# Patient Record
Sex: MALE | Race: WHITE | Employment: UNEMPLOYED | URBAN - METROPOLITAN AREA
[De-identification: names, ages, dates, MRNs, and addresses within clinical notes are randomized per-mention and may not be internally consistent; named-entity substitution may affect disease eponyms.]

---

## 2019-06-14 ENCOUNTER — HOSPITAL ENCOUNTER (INPATIENT)
Age: 44
LOS: 3 days | Discharge: HOME | DRG: 754 | End: 2019-06-17
Attending: PSYCHIATRY & NEUROLOGY | Admitting: PSYCHIATRY & NEUROLOGY
Payer: MEDICAID

## 2019-06-14 PROBLEM — F32.A DEPRESSION: Status: ACTIVE | Noted: 2019-06-14

## 2019-06-14 PROBLEM — F91.9 CONDUCT DISORDER: Status: ACTIVE | Noted: 2019-06-14

## 2019-06-14 PROCEDURE — 65220000003 HC RM SEMIPRIVATE PSYCH

## 2019-06-14 PROCEDURE — 74011250637 HC RX REV CODE- 250/637: Performed by: PSYCHIATRY & NEUROLOGY

## 2019-06-14 PROCEDURE — 74011250637 HC RX REV CODE- 250/637: Performed by: INTERNAL MEDICINE

## 2019-06-14 RX ORDER — HYDROXYZINE 25 MG/1
50 TABLET, FILM COATED ORAL
Status: DISCONTINUED | OUTPATIENT
Start: 2019-06-14 | End: 2019-06-17 | Stop reason: HOSPADM

## 2019-06-14 RX ORDER — IBUPROFEN 200 MG
1 TABLET ORAL
Status: DISCONTINUED | OUTPATIENT
Start: 2019-06-14 | End: 2019-06-17 | Stop reason: HOSPADM

## 2019-06-14 RX ORDER — METFORMIN HYDROCHLORIDE 500 MG/1
1000 TABLET ORAL 2 TIMES DAILY
Status: DISCONTINUED | OUTPATIENT
Start: 2019-06-14 | End: 2019-06-17 | Stop reason: HOSPADM

## 2019-06-14 RX ORDER — BENZTROPINE MESYLATE 2 MG/1
2 TABLET ORAL
Status: DISCONTINUED | OUTPATIENT
Start: 2019-06-14 | End: 2019-06-17 | Stop reason: HOSPADM

## 2019-06-14 RX ORDER — QUETIAPINE FUMARATE 50 MG/1
50 TABLET, FILM COATED ORAL 3 TIMES DAILY
COMMUNITY
End: 2019-06-17

## 2019-06-14 RX ORDER — ATORVASTATIN CALCIUM 40 MG/1
40 TABLET, FILM COATED ORAL DAILY
Status: DISCONTINUED | OUTPATIENT
Start: 2019-06-15 | End: 2019-06-17 | Stop reason: HOSPADM

## 2019-06-14 RX ORDER — RANITIDINE 150 MG/1
150 TABLET, FILM COATED ORAL
COMMUNITY
End: 2019-06-17

## 2019-06-14 RX ORDER — PANTOPRAZOLE SODIUM 40 MG/1
40 TABLET, DELAYED RELEASE ORAL DAILY
COMMUNITY
End: 2019-06-17

## 2019-06-14 RX ORDER — ACETAMINOPHEN 325 MG/1
650 TABLET ORAL
Status: DISCONTINUED | OUTPATIENT
Start: 2019-06-14 | End: 2019-06-17 | Stop reason: HOSPADM

## 2019-06-14 RX ORDER — LORAZEPAM 2 MG/ML
2 INJECTION INTRAMUSCULAR
Status: DISCONTINUED | OUTPATIENT
Start: 2019-06-14 | End: 2019-06-17 | Stop reason: HOSPADM

## 2019-06-14 RX ORDER — QUETIAPINE FUMARATE 100 MG/1
100 TABLET, FILM COATED ORAL
COMMUNITY
End: 2019-06-17

## 2019-06-14 RX ORDER — ATORVASTATIN CALCIUM 40 MG/1
40 TABLET, FILM COATED ORAL DAILY
COMMUNITY
End: 2019-06-17

## 2019-06-14 RX ORDER — LISINOPRIL 5 MG/1
7.5 TABLET ORAL DAILY
COMMUNITY
End: 2019-06-17

## 2019-06-14 RX ORDER — BENZTROPINE MESYLATE 1 MG/ML
2 INJECTION INTRAMUSCULAR; INTRAVENOUS
Status: DISCONTINUED | OUTPATIENT
Start: 2019-06-14 | End: 2019-06-17 | Stop reason: HOSPADM

## 2019-06-14 RX ORDER — TRAZODONE HYDROCHLORIDE 50 MG/1
50 TABLET ORAL
Status: DISCONTINUED | OUTPATIENT
Start: 2019-06-14 | End: 2019-06-17 | Stop reason: HOSPADM

## 2019-06-14 RX ORDER — LISINOPRIL 5 MG/1
7.5 TABLET ORAL DAILY
Status: DISCONTINUED | OUTPATIENT
Start: 2019-06-15 | End: 2019-06-17 | Stop reason: HOSPADM

## 2019-06-14 RX ORDER — ADHESIVE BANDAGE
30 BANDAGE TOPICAL DAILY PRN
Status: DISCONTINUED | OUTPATIENT
Start: 2019-06-14 | End: 2019-06-17 | Stop reason: HOSPADM

## 2019-06-14 RX ORDER — IBUPROFEN 600 MG/1
600 TABLET ORAL
COMMUNITY
End: 2019-06-17

## 2019-06-14 RX ORDER — TRAZODONE HYDROCHLORIDE 150 MG/1
150 TABLET ORAL
COMMUNITY
End: 2019-06-17

## 2019-06-14 RX ORDER — METFORMIN HYDROCHLORIDE 1000 MG/1
1000 TABLET ORAL 2 TIMES DAILY
COMMUNITY
End: 2019-06-17

## 2019-06-14 RX ORDER — IBUPROFEN 400 MG/1
400 TABLET ORAL
Status: DISCONTINUED | OUTPATIENT
Start: 2019-06-14 | End: 2019-06-17 | Stop reason: HOSPADM

## 2019-06-14 RX ORDER — TRAZODONE HYDROCHLORIDE 100 MG/1
100 TABLET ORAL
Status: ON HOLD | COMMUNITY
End: 2019-06-14

## 2019-06-14 RX ORDER — PANTOPRAZOLE SODIUM 40 MG/1
40 TABLET, DELAYED RELEASE ORAL DAILY
Status: DISCONTINUED | OUTPATIENT
Start: 2019-06-15 | End: 2019-06-17 | Stop reason: HOSPADM

## 2019-06-14 RX ORDER — ESCITALOPRAM OXALATE 10 MG/1
10 TABLET ORAL DAILY
COMMUNITY
End: 2019-06-17

## 2019-06-14 RX ORDER — OLANZAPINE 5 MG/1
5 TABLET ORAL
Status: DISCONTINUED | OUTPATIENT
Start: 2019-06-14 | End: 2019-06-17 | Stop reason: HOSPADM

## 2019-06-14 RX ADMIN — TRAZODONE HYDROCHLORIDE 50 MG: 50 TABLET ORAL at 21:01

## 2019-06-14 RX ADMIN — METFORMIN HYDROCHLORIDE 1000 MG: 500 TABLET ORAL at 21:01

## 2019-06-14 RX ADMIN — HYDROXYZINE HYDROCHLORIDE 50 MG: 25 TABLET ORAL at 21:01

## 2019-06-14 NOTE — BH NOTES
GROUP THERAPY PROGRESS NOTE    The patient Micha renae 38 y.o. male is participating in [facility] [employer]. Group time: 45 minutes    Personal goal for participation:  To participate in mental health journey game    Goal orientation:  personal    Group therapy participation: active    Therapeutic interventions reviewed and discussed: choices in recovery    Impression of participation:  The patient was attentive-required prompting    Yanira Shankar  6/14/2019  6:00 PM

## 2019-06-14 NOTE — H&P
History and Physical    Subjective:     Liliam Parnell is a 38 y.o. with past medical hx significant for DM, HTN, GERD, Hyperlipidemia, and obesity. Pt is hospitalized at [facility] with principal diagnosis of disruptive, impulse control and conduct disorder. Pt is from [address] [place] [address]. Pt takes Metformin for DM. Pt takes Zantac and Protonix  for GERD. Pt is taking lisinopril for HTN. Pt is taking Atrovastatin for hyperlipidemia. Pt denies any acute medical issues. Pt is showing no signs of acute distress. Pt appears to be in no acute distress. PMHx: DM, HTN, HLD, GERD. PSHx: none declared by pt. FHx: DM    Social History     Tobacco Use    Smoking status: None   Substance Use Topics    Alcohol use: None     > denies any illicit drugs. Prior to Admission medications    Medication Sig Start Date End Date Taking? Authorizing Provider   escitalopram oxalate (LEXAPRO) 10 mg tablet Take 10 mg by mouth daily. Yes Provider, Historical   QUEtiapine (SEROQUEL) 50 mg tablet Take 50 mg by mouth three (3) times daily. Indications: Psychotic disorder   Yes Provider, Historical   QUEtiapine (SEROQUEL) 100 mg tablet Take 100 mg by mouth nightly. Indications: Psychotic disorder   Yes Provider, Historical   lisinopril (PRINIVIL, ZESTRIL) 5 mg tablet Take 7.5 mg by mouth daily. Indications: high blood pressure   Yes Provider, Historical   metFORMIN (GLUCOPHAGE) 1,000 mg tablet Take 1,000 mg by mouth two (2) times a day. Indications: type 2 diabetes mellitus   Yes Provider, Historical   raNITIdine (ZANTAC) 150 mg tablet Take 150 mg by mouth nightly. Indications: heartburn   Yes Provider, Historical   ibuprofen (MOTRIN) 600 mg tablet Take 600 mg by mouth every eight (8) hours as needed for Pain. Yes Provider, Historical   traZODone (DESYREL) 150 mg tablet Take 150 mg by mouth nightly as needed for Sleep. Yes Provider, Historical   pantoprazole (PROTONIX) 40 mg tablet Take 40 mg by mouth daily.  Indications: heartburn Yes Provider, Historical   atorvastatin (LIPITOR) 40 mg tablet Take 40 mg by mouth daily. Indications: excessive fat in the blood   Yes Provider, Historical     No Known Allergies     Review of Systems:  Constitutional: negative  Eyes: negative  Ears, Nose, Mouth, Throat, and Face: negative  Respiratory: negative  Cardiovascular: HTN  Gastrointestinal: negative  Genitourinary:negative  Integument/Breast: negative  Hematologic/Lymphatic: negative  Musculoskeletal:negative  Neurological: negative  Behavioral/Psychiatric: Unspecified disruptive,impulse control and conduct disorder. Endocrine: Obesity, DM, HLD  Allergic/Immunologic: negative     Objective: Intake and Output:    No intake/output data recorded. No intake/output data recorded. Physical Exam:   Visit Vitals  /85 (BP 1 Location: Right arm, BP Patient Position: Sitting)   Pulse 93   Temp 98.2 °F (36.8 °C)   Resp 18   Ht 6' (1.829 m)   Wt 147.4 kg (325 lb)   SpO2 100%   BMI 44.08 kg/m²     General:  Alert, cooperative, no distress, appears stated age. Head:  Normocephalic, without obvious abnormality, atraumatic. Eyes:  Conjunctivae/corneas clear. PERRL, EOMs intact. Ears:  Normal external ear canals both ears. Nose: Nares normal. Septum midline. Mucosa normal. No drainage or sinus tenderness. Throat: Lips, mucosa, and tongue normal. Teeth and gums normal.   Neck: Supple, symmetrical, trachea midline, no adenopathy, thyroid: no enlargement/tenderness/nodules. Back:   Symmetric, no curvature. ROM normal. No CVA tenderness. Lungs:   Clear to auscultation bilaterally. Chest wall:  No tenderness or deformity. Heart:  Regular rate and rhythm, S1, S2 normal, no murmur, click, rub or gallop. Abdomen:   Soft, non-tender. Bowel sounds normal. No masses,  No organomegaly. Extremities: Extremities normal, atraumatic, no cyanosis or edema. Pulses: Distal pulses intact.     Skin: Skin color, texture, turgor normal. No rashes or lesions   Lymph nodes: No cervical or supraclavicular adenopathy. Neurologic: CNII-XII intact. Normal strength, sensation intact, reflexes 2/4 patellar region. Data Review:   No results found for this or any previous visit (from the past 24 hour(s)). Assessment:     Active Problems:    Depression (6/14/2019)      Conduct disorder (6/14/2019)    DM    Hyperlipidemia    Obesity    GERD    HTN    Plan:     Restart Metformin    Restart Lipitor    Restart Lisinopril    Restart Protonix/Zantac    No VTE prophylaxis indicated or necessary at this time.    Signed By: Cintia Ronquillo MD     June 14, 2019

## 2019-06-14 NOTE — BH NOTES
GROUP THERAPY PROGRESS NOTE    The patient Damion renae 38 y.o. male is participating in [employer]. Group time: 1 hour    Personal goal for participation: To concentrate on selected task    Goal orientation: social    Group therapy participation: active    Therapeutic interventions reviewed and discussed: Crafts, games, music    Impression of participation: The patient was attentive.     Terry Glez  6/14/2019  5:55 PM

## 2019-06-14 NOTE — PROGRESS NOTES
[facility] Medication Reconciliation     Recommendations/Findings:   1) TDO prescreening mentions patient being on insulin. However, I am not able to confirm with either of the outpatient pharmacies the patient uses that any insulin has been filled recently. Patient is a poor medication historian but does report that he hasn't used any insulin in at least a month. The following changes were made to the PTA medication list:   Additions:   Atorvastatin  Ibuprofen  Pantoprazole  Modifications:   Lisinopril dose changed from 5 mg to 7.5 mg  Ranitidine dose scheduled changed from daily to nightly  Trazodone dose and frequency changed  Deletions: None    Total Time Spent: 40 minutes    Information obtained from: Medication list from [facility] in [place] ([phone]), [facility] ([phone]), [place] [address] ([phone]), [facility] in [place] ([phone])    Patient allergies: Allergies as of 06/14/2019    (No Known Allergies)       Prior to Admission Medications   Prescriptions Last Dose Informant Patient Reported? Taking? QUEtiapine (SEROQUEL) 100 mg tablet 6/13/2019 Other Yes Yes   Sig: Take 100 mg by mouth nightly. Indications: Psychotic disorder   QUEtiapine (SEROQUEL) 50 mg tablet 6/13/2019 Other Yes Yes   Sig: Take 50 mg by mouth three (3) times daily. Indications: Psychotic disorder   atorvastatin (LIPITOR) 40 mg tablet  Other Yes Yes   Sig: Take 40 mg by mouth daily. Indications: excessive fat in the blood   escitalopram oxalate (LEXAPRO) 10 mg tablet 6/13/2019 Other Yes Yes   Sig: Take 10 mg by mouth daily. ibuprofen (MOTRIN) 600 mg tablet  Other Yes Yes   Sig: Take 600 mg by mouth every eight (8) hours as needed for Pain. lisinopril (PRINIVIL, ZESTRIL) 5 mg tablet 6/13/2019 Other Yes Yes   Sig: Take 7.5 mg by mouth daily.  Indications: high blood pressure   metFORMIN (GLUCOPHAGE) 1,000 mg tablet 6/13/2019 Other Yes Yes   Sig: Take 1,000 mg by mouth two (2) times a day. Indications: type 2 diabetes mellitus   pantoprazole (PROTONIX) 40 mg tablet  Other Yes Yes   Sig: Take 40 mg by mouth daily. Indications: heartburn   raNITIdine (ZANTAC) 150 mg tablet 6/13/2019 Other Yes Yes   Sig: Take 150 mg by mouth nightly. Indications: heartburn   traZODone (DESYREL) 150 mg tablet  Other Yes Yes   Sig: Take 150 mg by mouth nightly as needed for Sleep.       Facility-Administered Medications: None       Thank you,  SIGRID Mckeon  Desk: [phone] ([id number])  Pharmacy: [phone] ([id number])

## 2019-06-14 NOTE — PROGRESS NOTES
1000  Pt admitted to unit, arrived via wheelchair. Pt is ambulatory, skin check performed, checked for contraband, tour of unit and room performed, diet order placed (Diabetic with options, 2000 calorie). Pt presently denies SI/HI/AVH but states he wanted to kill himself earlier when he told his mother this. (Lives with mother and is disabled, mild cogntive impairment, highest level of schooling = 9th grade). Pt stated he had no plan or means. Pt denied wanting to harm his mother but mother reported to ER staff that he had thoughts of smothering his mother with a pillow. Pt is diabetic but does not check BS at home. Pt does not know home medications; PTA put into file from written chart. Pt stated he gets his meds from [facility] in [place]. Pt states he hasn't been sleeping well and plans to \"catch up\" while here at the hospital.  Pt remains safe on 15 minute checks at this time. 1315  Pt in room, resting quietly. No complaints or concerns at this time. 65  Pt is in day room socializing with peer. Pt's laundry done and pt states he is going to take a shower after dinner. Pt denies any concerns or issues at this time. 1800  Pt seen by medical doctor for H&P. Pt able to report that he has Diabetes Type II and Hypertension but is unable to state what medications he takes. Pt went back to day room to watch television with peers and appears quite content as he smiles, laughs and talks. Pt remains safe on 15 minute checks at this time.

## 2019-06-15 PROCEDURE — 74011250637 HC RX REV CODE- 250/637: Performed by: INTERNAL MEDICINE

## 2019-06-15 PROCEDURE — 74011250637 HC RX REV CODE- 250/637: Performed by: PSYCHIATRY & NEUROLOGY

## 2019-06-15 PROCEDURE — 65220000003 HC RM SEMIPRIVATE PSYCH

## 2019-06-15 RX ORDER — MAG HYDROX/ALUMINUM HYD/SIMETH 200-200-20
30 SUSPENSION, ORAL (FINAL DOSE FORM) ORAL
Status: DISCONTINUED | OUTPATIENT
Start: 2019-06-15 | End: 2019-06-17 | Stop reason: HOSPADM

## 2019-06-15 RX ADMIN — METFORMIN HYDROCHLORIDE 1000 MG: 500 TABLET ORAL at 08:07

## 2019-06-15 RX ADMIN — ATORVASTATIN CALCIUM 40 MG: 40 TABLET, FILM COATED ORAL at 08:07

## 2019-06-15 RX ADMIN — IBUPROFEN 400 MG: 400 TABLET ORAL at 08:57

## 2019-06-15 RX ADMIN — ALUMINUM HYDROXIDE, MAGNESIUM HYDROXIDE, AND SIMETHICONE 30 ML: 200; 200; 20 SUSPENSION ORAL at 17:12

## 2019-06-15 RX ADMIN — ALUMINUM HYDROXIDE, MAGNESIUM HYDROXIDE, AND SIMETHICONE 30 ML: 200; 200; 20 SUSPENSION ORAL at 22:48

## 2019-06-15 RX ADMIN — HYDROXYZINE HYDROCHLORIDE 50 MG: 25 TABLET ORAL at 15:56

## 2019-06-15 RX ADMIN — LISINOPRIL 7.5 MG: 5 TABLET ORAL at 08:07

## 2019-06-15 RX ADMIN — METFORMIN HYDROCHLORIDE 1000 MG: 500 TABLET ORAL at 17:12

## 2019-06-15 RX ADMIN — PANTOPRAZOLE SODIUM 40 MG: 40 TABLET, DELAYED RELEASE ORAL at 08:07

## 2019-06-15 RX ADMIN — HYDROXYZINE HYDROCHLORIDE 50 MG: 25 TABLET ORAL at 21:37

## 2019-06-15 RX ADMIN — HYDROXYZINE HYDROCHLORIDE 50 MG: 25 TABLET ORAL at 09:52

## 2019-06-15 RX ADMIN — TRAZODONE HYDROCHLORIDE 50 MG: 50 TABLET ORAL at 21:37
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
## 2019-06-15 NOTE — BH NOTES
INITIAL PSYCHIATRIC EVALUATION            IDENTIFICATION:    Patient Name  Mercedes Antunez   Date of Birth 1975   [facility] [id number]   Medical Record Number  [id number]      Age  38 y.o. PCP None   Admit date:  6/14/2019    Room Number  308/02  @ [facility]   Date of Service  6/15/2019            HISTORY         REASON FOR HOSPITALIZATION:  CC: \"I want to go home\". HISTORY OF PRESENT ILLNESS:    Patient is a poor historian. He is focused on going home and would not talk about other issues. He did admit to SI before coming to hospital but did not elaborate on this. Per ER notes, patient had expressed SI and in addition had reported to ER staff he had thought of smothering mother with pillows. Currently denies SI/HI but displays manipulative behavior as when informed that he could not be discharged today, he stated that he would attempt to jump out through the window. He however later retracted this statement. Denies psychotic symptoms. ALLERGIES: No Known Allergies   MEDICATIONS PRIOR TO ADMISSION:   Medications Prior to Admission   Medication Sig    escitalopram oxalate (LEXAPRO) 10 mg tablet Take 10 mg by mouth daily.  QUEtiapine (SEROQUEL) 50 mg tablet Take 50 mg by mouth three (3) times daily. Indications: Psychotic disorder    QUEtiapine (SEROQUEL) 100 mg tablet Take 100 mg by mouth nightly. Indications: Psychotic disorder    lisinopril (PRINIVIL, ZESTRIL) 5 mg tablet Take 7.5 mg by mouth daily. Indications: high blood pressure    metFORMIN (GLUCOPHAGE) 1,000 mg tablet Take 1,000 mg by mouth two (2) times a day. Indications: type 2 diabetes mellitus    raNITIdine (ZANTAC) 150 mg tablet Take 150 mg by mouth nightly. Indications: heartburn    ibuprofen (MOTRIN) 600 mg tablet Take 600 mg by mouth every eight (8) hours as needed for Pain.  traZODone (DESYREL) 150 mg tablet Take 150 mg by mouth nightly as needed for Sleep.     pantoprazole (PROTONIX) 40 mg tablet Take 40 mg by mouth daily. Indications: heartburn    atorvastatin (LIPITOR) 40 mg tablet Take 40 mg by mouth daily. Indications: excessive fat in the blood      PAST MEDICAL HISTORY:   No past medical history on file. No past surgical history on file. SOCIAL HISTORY:   Social History     Socioeconomic History    Marital status: SINGLE     Spouse name: Not on file    Number of children: Not on file    Years of education: Not on file    Highest education level: Not on file   Occupational History    Not on file   Social Needs    Financial resource strain: Not on file    Food insecurity:     Worry: Not on file     Inability: Not on file    Transportation needs:     Medical: Not on file     Non-medical: Not on file   Tobacco Use    Smoking status: Not on file   Substance and Sexual Activity    Alcohol use: Not on file    Drug use: Not on file    Sexual activity: Not on file   Lifestyle    Physical activity:     Days per week: Not on file     Minutes per session: Not on file    Stress: Not on file   Relationships    Social connections:     Talks on phone: Not on file     Gets together: Not on file     Attends [employer] service: Not on file     Active member of club or organization: Not on file     Attends meetings of clubs or organizations: Not on file     Relationship status: Not on file    Intimate partner violence:     Fear of current or ex partner: Not on file     Emotionally abused: Not on file     Physically abused: Not on file     Forced sexual activity: Not on file   Other Topics Concern    Not on file   Social History Narrative    Not on file      FAMILY HISTORY: History reviewed. No pertinent family history. No family history on file. REVIEW OF SYSTEMS:   Pertinent items are noted in the History of Present Illness. All other Systems reviewed and are considered negative.            MENTAL STATUS EXAM & VITALS     MENTAL STATUS EXAM (MSE):    MSE FINDINGS ARE WITHIN NORMAL LIMITS (WNL) UNLESS OTHERWISE STATED BELOW. ( ALL OF THE BELOW CATEGORIES OF THE MSE HAVE BEEN REVIEWED (reviewed 6/15/2019) AND UPDATED AS DEEMED APPROPRIATE )  Appearance: Well kempt, maintains eye contact  Behavior: Manipulative. Speech: WNL  Mood: \"OK\"  Affect: Constricted  Thought process: Logical  Thought content: No delusions elicited  SI/HI: Denies  Perception: Denies AH/VH  Insight: Impaired                                                                                           VITALS:     Patient Vitals for the past 24 hrs:   Temp Pulse Resp BP SpO2   06/15/19 0800 97.8 °F (36.6 °C) 98 20 157/87 95 %   06/14/19 1930 98 °F (36.7 °C) 86 20 149/67 99 %     Wt Readings from Last 3 Encounters:   06/14/19 147.4 kg (325 lb)     Temp Readings from Last 3 Encounters:   06/15/19 97.8 °F (36.6 °C)     BP Readings from Last 3 Encounters:   06/15/19 157/87     Pulse Readings from Last 3 Encounters:   06/15/19 98            DATA     LABORATORY DATA:  Labs Reviewed - No data to display  No results found for any previous visit. RADIOLOGY REPORTS:  No results found for this or any previous visit. No results found.            MEDICATIONS       ALL MEDICATIONS  Current Facility-Administered Medications   Medication Dose Route Frequency    alum-mag hydroxide-simeth (MYLANTA) oral suspension 30 mL  30 mL Oral Q4H PRN    ziprasidone (GEODON) 20 mg in sterile water (preservative free) 1 mL injection  20 mg IntraMUSCular BID PRN    OLANZapine (ZyPREXA) tablet 5 mg  5 mg Oral Q6H PRN    benztropine (COGENTIN) tablet 2 mg  2 mg Oral BID PRN    benztropine (COGENTIN) injection 2 mg  2 mg IntraMUSCular BID PRN    LORazepam (ATIVAN) injection 2 mg  2 mg IntraMUSCular Q4H PRN    acetaminophen (TYLENOL) tablet 650 mg  650 mg Oral Q4H PRN    ibuprofen (MOTRIN) tablet 400 mg  400 mg Oral Q8H PRN    magnesium hydroxide (MILK OF MAGNESIA) 400 mg/5 mL oral suspension 30 mL  30 mL Oral DAILY PRN    nicotine (NICODERM CQ) 21 mg/24 hr patch 1 Patch  1 Patch TransDERmal DAILY PRN    hydrOXYzine HCl (ATARAX) tablet 50 mg  50 mg Oral Q6H PRN    traZODone (DESYREL) tablet 50 mg  50 mg Oral QHS PRN    atorvastatin (LIPITOR) tablet 40 mg  40 mg Oral DAILY    lisinopril (PRINIVIL, ZESTRIL) tablet 7.5 mg  7.5 mg Oral DAILY    metFORMIN (GLUCOPHAGE) tablet 1,000 mg  1,000 mg Oral BID    pantoprazole (PROTONIX) tablet 40 mg  40 mg Oral DAILY      SCHEDULED MEDICATIONS  Current Facility-Administered Medications   Medication Dose Route Frequency    atorvastatin (LIPITOR) tablet 40 mg  40 mg Oral DAILY    lisinopril (PRINIVIL, ZESTRIL) tablet 7.5 mg  7.5 mg Oral DAILY    metFORMIN (GLUCOPHAGE) tablet 1,000 mg  1,000 mg Oral BID    pantoprazole (PROTONIX) tablet 40 mg  40 mg Oral DAILY                ASSESSMENT & PLAN        The patient, Natalee Oliveira, is a 38 y.o.  male who presents at this time for treatment of the following diagnoses:  Patient Active Hospital Problem List:   Depression (6/14/2019)    Assessment: Mood disorder                           History of conduct disorder (per ER notes)    Plan:   Continue current care      I will continue to monitor blood levels (Depakote, Tegretol, lithium, clozapine---a drug with a narrow therapeutic index= NTI) and associated labs for drug therapy implemented that require intense monitoring for toxicity as deemed appropriate based on current medication side effects and pharmacodynamically determined drug 1/2 lives. A coordinated, multidisplinary treatment team (includes the nurse, unit pharmcist,  and writer) round was conducted for this initial evaluation with the patient present. The following regarding medications was addressed during rounds with patient:   the risks and benefits of the proposed medication. The patient was given the opportunity to ask questions. Informed consent given to the use of the above medications.      I will continue to adjust psychiatric and non-psychiatric medications (see above \"medication\" section and orders section for details) as deemed appropriate & based upon diagnoses and response to treatment. I have reviewed admission (and previous/old) labs and medical tests in the EHR and or transferring hospital documents. I will continue to order blood tests/labs and diagnostic tests as deemed appropriate and review results as they become available (see orders for details). I have reviewed old psychiatric and medical records available in the EHR. I Will order additional psychiatric records from other institutions to further elucidate the nature of patient's psychopathology and review once available. I will gather additional collateral information from friends, family and o/p treatment team to further elucidate the nature of patient's psychopathology and baselline level of psychiatric functioning.       ESTIMATED LENGTH OF STAY:    5 to 7 days       STRENGTHS:  Access to housing/residential stability                                        SIGNED:    Karthikeyan Mcclelland MD  6/15/2019

## 2019-06-16 LAB
ALBUMIN SERPL-MCNC: 3.8 G/DL (ref 3.5–5)
ALBUMIN/GLOB SERPL: 1.1 {RATIO} (ref 1.1–2.2)
ALP SERPL-CCNC: 62 U/L (ref 45–117)
ALT SERPL-CCNC: 45 U/L (ref 12–78)
ANION GAP SERPL CALC-SCNC: 10 MMOL/L (ref 5–15)
AST SERPL-CCNC: 26 U/L (ref 15–37)
BILIRUB SERPL-MCNC: 0.7 MG/DL (ref 0.2–1)
BUN SERPL-MCNC: 24 MG/DL (ref 6–20)
BUN/CREAT SERPL: 26 (ref 12–20)
CALCIUM SERPL-MCNC: 9.6 MG/DL (ref 8.5–10.1)
CHLORIDE SERPL-SCNC: 99 MMOL/L (ref 97–108)
CHOLEST SERPL-MCNC: 164 MG/DL
CO2 SERPL-SCNC: 29 MMOL/L (ref 21–32)
CREAT SERPL-MCNC: 0.91 MG/DL (ref 0.7–1.3)
EST. AVERAGE GLUCOSE BLD GHB EST-MCNC: 303 MG/DL
GLOBULIN SER CALC-MCNC: 3.4 G/DL (ref 2–4)
GLUCOSE SERPL-MCNC: 273 MG/DL (ref 65–100)
HBA1C MFR BLD: 12.2 % (ref 4.2–6.3)
HDLC SERPL-MCNC: 31 MG/DL
HDLC SERPL: 5.3 {RATIO} (ref 0–5)
LDLC SERPL CALC-MCNC: 82.2 MG/DL (ref 0–100)
LIPID PROFILE,FLP: ABNORMAL
POTASSIUM SERPL-SCNC: 3.5 MMOL/L (ref 3.5–5.1)
PROT SERPL-MCNC: 7.2 G/DL (ref 6.4–8.2)
SODIUM SERPL-SCNC: 138 MMOL/L (ref 136–145)
TRIGL SERPL-MCNC: 254 MG/DL (ref ?–150)
VLDLC SERPL CALC-MCNC: 50.8 MG/DL

## 2019-06-16 PROCEDURE — 36415 COLL VENOUS BLD VENIPUNCTURE: CPT

## 2019-06-16 PROCEDURE — 65220000003 HC RM SEMIPRIVATE PSYCH

## 2019-06-16 PROCEDURE — 80053 COMPREHEN METABOLIC PANEL: CPT

## 2019-06-16 PROCEDURE — 83036 HEMOGLOBIN GLYCOSYLATED A1C: CPT

## 2019-06-16 PROCEDURE — 74011250637 HC RX REV CODE- 250/637: Performed by: PSYCHIATRY & NEUROLOGY

## 2019-06-16 PROCEDURE — 74011250637 HC RX REV CODE- 250/637: Performed by: INTERNAL MEDICINE

## 2019-06-16 PROCEDURE — 80061 LIPID PANEL: CPT

## 2019-06-16 RX ADMIN — HYDROXYZINE HYDROCHLORIDE 50 MG: 25 TABLET ORAL at 13:26

## 2019-06-16 RX ADMIN — LISINOPRIL 7.5 MG: 5 TABLET ORAL at 08:17

## 2019-06-16 RX ADMIN — TRAZODONE HYDROCHLORIDE 50 MG: 50 TABLET ORAL at 21:21

## 2019-06-16 RX ADMIN — PANTOPRAZOLE SODIUM 40 MG: 40 TABLET, DELAYED RELEASE ORAL at 08:17

## 2019-06-16 RX ADMIN — ATORVASTATIN CALCIUM 40 MG: 40 TABLET, FILM COATED ORAL at 08:17

## 2019-06-16 RX ADMIN — METFORMIN HYDROCHLORIDE 1000 MG: 500 TABLET ORAL at 08:17

## 2019-06-16 RX ADMIN — METFORMIN HYDROCHLORIDE 1000 MG: 500 TABLET ORAL at 17:01

## 2019-06-16 NOTE — PROGRESS NOTES
7109  Received report from [place], [facility].    0830  Pt reports that his mother is downstairs waiting for him to be discharged. Pt denies SI/HI/AVH. 1030  Pt is pacing the hallway, smiling. Pt denies any concerns or issues at this time. [phone] [id number]  Pt requests that the doctor go to talk with his mother downstairs. Pt states that he is \"all better now\" due to his medications. 1445  Pt is watching TV in day room. Pt appears to be content, no agitation present. 1700  Pt reports that doctor spoke with mother downstairs. Pt asks if he needs to get up early in the morning so he's \"ready for discharge after the court gets here at 11:00\". 0  Pt in day room socializing with peers. Pt remains safe on 15 minute checks at this time.

## 2019-06-16 NOTE — BH NOTES
19:10 - Received report from Ardyth Bernheim, RN.    19:30 - Patient in dayroom interacting with peers and staff. Patient is in a good mood, state \"I had a great day\". Patient's mom is coming to [place] from [place]. 21:00 - Patient states he is feeling anxious. PRN Atarax and Trazodone give for anxiety and to promote sleep. Patient compliant with HS medications. 22:59 - Patient resting quietly in in bed with eyes closed. PRN Atarax and Trazodone effective    01:40 - Patient resting quietly with eyes closed. NAD noted. 03:00 - Patient resting quietly with eyes closed. 06:09 - Hourly rounds made overnight and continue. Patient slept approximately 5.75 hours this shift.

## 2019-06-16 NOTE — BH NOTES
Chief Complaint:  \"I am OK. \"    Interval History:  Patient reports he feels well. Denies ongoing SI. Denies HI  Denies psychotic symptoms. Per staff, mother is requesting that patient be discharged. She is in the ER and wishes to speak with this writer with regards to discharge plans for son. Mental Status Exam:  Appearance: Well kempt, maintains eye contact  Behavior: Cooperative. Speech: WNL  Mood: \"OK\"  Affect: Constricted  Thought process: Logical  Thought content: No delusions elicited  SI/HI: Denies  Perception: Denies AH/VH  Insight: Impaired    Assessment and Plan:  Mood disorder    Continue the current medication regimen   Disposition planning to continue. I certify that this patients inpatient psychiatric hospital services furnished since the previous certification were, and continue to be, required for treatment that could reasonably be expected to improve the patient's condition, or for diagnostic study, and that the patient continues to need, on a daily basis, active treatment furnished directly by or requiring the supervision of inpatient psychiatric facility personnel. In addition, the hospital records show that services furnished were intensive treatment services, admission or related services, or equivalent services. Current Facility-Administered Medications   Medication Dose Route Frequency    atorvastatin (LIPITOR) tablet 40 mg  40 mg Oral DAILY    lisinopril (PRINIVIL, ZESTRIL) tablet 7.5 mg  7.5 mg Oral DAILY    metFORMIN (GLUCOPHAGE) tablet 1,000 mg  1,000 mg Oral BID    pantoprazole (PROTONIX) tablet 40 mg  40 mg Oral DAILY       Physical Exam:         Past Medical History:  No past medical history on file.         Labs:  No results found for: WBC, WBCLT, HGBPOC, HGB, HGBP, HCTPOC, HCT, PHCT, RBCH, PLT, MCV, HGBEXT, HCTEXT, PLTEXT   Lab Results   Component Value Date/Time    Sodium 138 06/16/2019 04:58 AM    Potassium 3.5 06/16/2019 04:58 AM    Chloride 99 06/16/2019 04:58 AM CO2 29 06/16/2019 04:58 AM    Anion gap 10 06/16/2019 04:58 AM    Glucose 273 (H) 06/16/2019 04:58 AM    BUN 24 (H) 06/16/2019 04:58 AM    Creatinine 0.91 06/16/2019 04:58 AM    BUN/Creatinine ratio 26 (H) 06/16/2019 04:58 AM    GFR est AA >60 06/16/2019 04:58 AM    GFR est non-AA >60 06/16/2019 04:58 AM    Calcium 9.6 06/16/2019 04:58 AM    Bilirubin, total 0.7 06/16/2019 04:58 AM    AST (SGOT) 26 06/16/2019 04:58 AM    Alk.  phosphatase 62 06/16/2019 04:58 AM    Protein, total 7.2 06/16/2019 04:58 AM    Albumin 3.8 06/16/2019 04:58 AM    Globulin 3.4 06/16/2019 04:58 AM    A-G Ratio 1.1 06/16/2019 04:58 AM    ALT (SGPT) 45 06/16/2019 04:58 AM      Vitals:    06/15/19 0800 06/15/19 1650 06/15/19 1930 06/16/19 0700   BP: 157/87 137/84 140/84 131/78   Pulse: 98 94 80 93   Resp: 20 20 18 18   Temp: 97.8 °F (36.6 °C) 97.4 °F (36.3 °C) 97.5 °F (36.4 °C) 97.4 °F (36.3 °C)   SpO2: 95% 98% 96% 96%   Weight:       Height:

## 2019-06-17 VITALS
SYSTOLIC BLOOD PRESSURE: 127 MMHG | DIASTOLIC BLOOD PRESSURE: 88 MMHG | WEIGHT: 315 LBS | BODY MASS INDEX: 42.66 KG/M2 | TEMPERATURE: 97.6 F | RESPIRATION RATE: 18 BRPM | HEIGHT: 72 IN | OXYGEN SATURATION: 97 % | HEART RATE: 88 BPM

## 2019-06-17 LAB
GLUCOSE BLD STRIP.AUTO-MCNC: 249 MG/DL (ref 65–100)
GLUCOSE BLD STRIP.AUTO-MCNC: 258 MG/DL (ref 65–100)
SERVICE CMNT-IMP: ABNORMAL
SERVICE CMNT-IMP: ABNORMAL
TSH SERPL DL<=0.05 MIU/L-ACNC: 2.75 UIU/ML (ref 0.36–3.74)

## 2019-06-17 PROCEDURE — 74011250637 HC RX REV CODE- 250/637: Performed by: INTERNAL MEDICINE

## 2019-06-17 PROCEDURE — 74011636637 HC RX REV CODE- 636/637: Performed by: PSYCHIATRY & NEUROLOGY

## 2019-06-17 PROCEDURE — 36415 COLL VENOUS BLD VENIPUNCTURE: CPT

## 2019-06-17 PROCEDURE — 74011250637 HC RX REV CODE- 250/637: Performed by: PSYCHIATRY & NEUROLOGY

## 2019-06-17 PROCEDURE — 82962 GLUCOSE BLOOD TEST: CPT

## 2019-06-17 PROCEDURE — 84443 ASSAY THYROID STIM HORMONE: CPT

## 2019-06-17 RX ORDER — INSULIN LISPRO 100 [IU]/ML
INJECTION, SOLUTION INTRAVENOUS; SUBCUTANEOUS
Status: DISCONTINUED | OUTPATIENT
Start: 2019-06-17 | End: 2019-06-17 | Stop reason: HOSPADM

## 2019-06-17 RX ORDER — INSULIN LISPRO 100 [IU]/ML
INJECTION, SOLUTION INTRAVENOUS; SUBCUTANEOUS
Status: DISCONTINUED | OUTPATIENT
Start: 2019-06-17 | End: 2019-06-17

## 2019-06-17 RX ORDER — MAGNESIUM SULFATE 100 %
4 CRYSTALS MISCELLANEOUS AS NEEDED
Status: DISCONTINUED | OUTPATIENT
Start: 2019-06-17 | End: 2019-06-17 | Stop reason: HOSPADM

## 2019-06-17 RX ORDER — DEXTROSE 50 % IN WATER (D50W) INTRAVENOUS SYRINGE
12.5-25 AS NEEDED
Status: DISCONTINUED | OUTPATIENT
Start: 2019-06-17 | End: 2019-06-17 | Stop reason: HOSPADM

## 2019-06-17 RX ADMIN — METFORMIN HYDROCHLORIDE 1000 MG: 500 TABLET ORAL at 08:08

## 2019-06-17 RX ADMIN — INSULIN LISPRO 3 UNITS: 100 INJECTION, SOLUTION INTRAVENOUS; SUBCUTANEOUS at 12:36

## 2019-06-17 RX ADMIN — ACETAMINOPHEN 650 MG: 325 TABLET, FILM COATED ORAL at 11:24

## 2019-06-17 RX ADMIN — LISINOPRIL 7.5 MG: 5 TABLET ORAL at 08:08

## 2019-06-17 RX ADMIN — PANTOPRAZOLE SODIUM 40 MG: 40 TABLET, DELAYED RELEASE ORAL at 08:09

## 2019-06-17 RX ADMIN — ATORVASTATIN CALCIUM 40 MG: 40 TABLET, FILM COATED ORAL at 08:09

## 2019-06-17 RX ADMIN — HYDROXYZINE HYDROCHLORIDE 50 MG: 25 TABLET ORAL at 12:23

## 2019-06-17 NOTE — BH NOTES
Behavioral Health Transition Record to Provider    Patient Name: Arvin Glez  YOB: 1975  Medical Record Number: [id number]  Date of Admission: 6/14/2019  Date of Discharge: 6/18/2019    Attending Provider: Belem Alvarenga MD  Discharging Provider: Belem Alvarenga MD  To contact this individual call [phone] and ask the  to page. If unavailable, ask to be transferred to [facility] Provider on call. [facility] Provider will be available on call 24/7 and during holidays. Primary Care Provider: None    No Known Allergies    Reason for Admission: Per prescreening pt was in contact with CM and made disclosures of SI and later that day it escalated to SI and HI stating he was going to kill self and parents. Pt expressed he wanted to be hospitalized. Pt has a history of medication noncompliance with last appointment in October 2018. Admission Diagnosis: Depression [F32.9]  Conduct disorder [F91.9]    * No surgery found *    Results for orders placed or performed during the hospital encounter of 06/14/19   LIPID PANEL   Result Value Ref Range    LIPID PROFILE          Cholesterol, total 164 <200 MG/DL    Triglyceride 254 (H) <150 MG/DL    HDL Cholesterol 31 MG/DL    LDL, calculated 82.2 0 - 100 MG/DL    VLDL, calculated 50.8 MG/DL    CHOL/HDL Ratio 5.3 (H) 0.0 - 5.0     METABOLIC PANEL, COMPREHENSIVE   Result Value Ref Range    Sodium 138 136 - 145 mmol/L    Potassium 3.5 3.5 - 5.1 mmol/L    Chloride 99 97 - 108 mmol/L    CO2 29 21 - 32 mmol/L    Anion gap 10 5 - 15 mmol/L    Glucose 273 (H) 65 - 100 mg/dL    BUN 24 (H) 6 - 20 MG/DL    Creatinine 0.91 0.70 - 1.30 MG/DL    BUN/Creatinine ratio 26 (H) 12 - 20      GFR est AA >60 >60 ml/min/1.73m2    GFR est non-AA >60 >60 ml/min/1.73m2    Calcium 9.6 8.5 - 10.1 MG/DL    Bilirubin, total 0.7 0.2 - 1.0 MG/DL    ALT (SGPT) 45 12 - 78 U/L    AST (SGOT) 26 15 - 37 U/L    Alk.  phosphatase 62 45 - 117 U/L    Protein, total 7.2 6.4 - 8.2 g/dL    Albumin 3.8 3.5 - 5.0 g/dL    Globulin 3.4 2.0 - 4.0 g/dL    A-G Ratio 1.1 1.1 - 2.2     HEMOGLOBIN A1C WITH EAG   Result Value Ref Range    Hemoglobin A1c 12.2 (H) 4.2 - 6.3 %    Est. average glucose 303 mg/dL   TSH 3RD GENERATION   Result Value Ref Range    TSH 2.75 0.36 - 3.74 uIU/mL   GLUCOSE, POC   Result Value Ref Range    Glucose (POC) 249 (H) 65 - 100 mg/dL    Performed by Emily Ackerman (RN)    GLUCOSE, POC   Result Value Ref Range    Glucose (POC) 258 (H) 65 - 100 mg/dL    Performed by Emily Ackerman (RN)        Immunizations administered during this encounter: There is no immunization history on file for this patient. Screening for Metabolic Disorders for Patients on Antipsychotic Medications  (Data obtained from the EMR)    Estimated Body Mass Index  Estimated body mass index is 44.08 kg/m² as calculated from the following:    Height as of this encounter: 6' (1.829 m). Weight as of this encounter: 147.4 kg (325 lb). Vital Signs/Blood Pressure  Visit Vitals  /88 (BP 1 Location: Right arm)   Pulse 88   Temp 97.6 °F (36.4 °C)   Resp 18   Ht 6' (1.829 m)   Wt 147.4 kg (325 lb)   SpO2 97%   BMI 44.08 kg/m²       Blood Glucose/Hemoglobin A1c  Lab Results   Component Value Date/Time    Glucose 273 (H) 06/16/2019 04:58 AM    Glucose (POC) 258 (H) 06/17/2019 11:29 AM       Lab Results   Component Value Date/Time    Hemoglobin A1c 12.2 (H) 06/16/2019 04:58 AM        Lipid Panel  Lab Results   Component Value Date/Time    Cholesterol, total 164 06/16/2019 04:58 AM    HDL Cholesterol 31 06/16/2019 04:58 AM    LDL, calculated 82.2 06/16/2019 04:58 AM    Triglyceride 254 (H) 06/16/2019 04:58 AM    CHOL/HDL Ratio 5.3 (H) 06/16/2019 04:58 AM        Discharge Diagnosis: Please refer to physician's discharge summary. Discharge Plan: Pt was discharged and transported home by mother. Pt was denied by the court. Pt denies SI/HI/AH.    Pt's thought process is coherent and future oriented. Pt is in agreement with follow [facility] plan [facility] and meet with ID worker [employer]. Discharge Medication List and Instructions:   Discharge Medication List as of 6/17/2019  2:58 PM      STOP taking these medications       escitalopram oxalate (LEXAPRO) 10 mg tablet Comments:   Reason for Stopping:         QUEtiapine (SEROQUEL) 50 mg tablet Comments:   Reason for Stopping:         QUEtiapine (SEROQUEL) 100 mg tablet Comments:   Reason for Stopping:         lisinopril (PRINIVIL, ZESTRIL) 5 mg tablet Comments:   Reason for Stopping:         metFORMIN (GLUCOPHAGE) 1,000 mg tablet Comments:   Reason for Stopping:         raNITIdine (ZANTAC) 150 mg tablet Comments:   Reason for Stopping:         ibuprofen (MOTRIN) 600 mg tablet Comments:   Reason for Stopping:         traZODone (DESYREL) 150 mg tablet Comments:   Reason for Stopping:         pantoprazole (PROTONIX) 40 mg tablet Comments:   Reason for Stopping:         atorvastatin (LIPITOR) 40 mg tablet Comments:   Reason for Stopping:               Unresulted Labs (24h ago, onward)    None        To obtain results of studies pending at discharge, please contact [phone]    Follow-up Information     Follow up With Specialties Details Why [address] [address]  Go on 6/18/2019 Walk in Monday - Friday 8:00 AM - 3:00 PM  Please bring photo I.D., list of medications and insurance card. [facility]  Address: [address] [place] [address].  Phone: [phone]          Advanced Directive:   Does the patient have an appointed surrogate decision maker? Unknown   Does the patient have a Medical Advance Directive? Unknown   Does the patient have a Psychiatric Advance Directive? Unknown   If the patient does not have a surrogate or Medical Advance Directive AND Psychiatric Advance Directive, the patient was offered information on these advance directives. Unknown        Patient Instructions: Please continue all medications until otherwise directed by physician. Tobacco Cessation Discharge Plan:   Is the patient a smoker and needs referral for smoking cessation? No  Patient referred to the following for smoking cessation with an appointment? No   Patient was offered medication to assist with smoking cessation at discharge? No  Was education for smoking cessation added to the discharge instructions? No     Alcohol/Substance Abuse Discharge Plan:   Does the patient have a history of substance/alcohol abuse and requires a referral for treatment? Yes  Patient referred to the following for substance/alcohol abuse treatment with an appointment? Yes  Patient was offered medication to assist with alcohol cessation at discharge? No  Was education for substance/alcohol abuse added to discharge instructions? Yes     Patient discharged to Home; provided to the patient/caregiver either in hard copy or electronically. Continuing care paperwork was faxed to community mental health providers.

## 2019-06-17 NOTE — DISCHARGE INSTRUCTIONS
Patient Education        Preventing a Relapse of Depression: Care Instructions  Your Care Instructions    A relapse of depression means your symptoms have come back after you have gotten better. This illness often comes and goes during a lifetime. But there are many things you can do to keep it from coming back. Follow-up care is a key part of your treatment and safety. Be sure to make and go to all appointments, and call your doctor if you are having problems. It's also a good idea to know your test results and keep a list of the medicines you take. What do you need to know? Know your risk of relapse  Talk to your doctor to find out if you are at risk of relapse. Many things can make a person more likely to relapse into depression. These include having a family member with depression, dealing with serious problems in a relationship or a job, having a serious medical condition, or abusing drugs or alcohol. It is important to know your risk and to recognize warning signs of relapse. Once you know these things, you will be better able to keep it from happening to you. Know the warning signs of relapse  The two most common signs of relapse are:  · Feeling sad or hopeless. · Losing interest in your daily activities. You may have other symptoms, such as:  · You lose or gain weight. · You sleep too much or not enough. · You feel restless and unable to sit still. · You feel unable to move. · You feel tired all the time. · You feel unworthy or guilty without an obvious reason. · You have problems concentrating, remembering, or making decisions. · You think often about death or suicide. · You feel angry or have panic attacks. How can you care for yourself at home? · Take your medicine as prescribed. Call your doctor if you have any problems with your medicine. Many people take their medicines for at least 6 months after they have recovered. This often helps keep symptoms from coming back.  However, if your depression keeps coming back, you may have to take medicine for the rest of your life. · Continue counseling even after you have stopped taking medicine. · Eat healthy foods. Include fruits, vegetables, beans, and whole grains in your diet each day. · Get at least 30 minutes of exercise on most days of the week. Walking is a good choice. You also may want to do other activities, such as running, swimming, cycling, or playing tennis or team sports. · See your doctor right away if you have new symptoms or feel that your depression is coming back. · Keep a regular sleep schedule. Try for 8 hours of sleep a night. · Avoid alcohol and illegal drugs. · Keep the numbers for these national suicide hotlines: 6-429-683-TALK (5-205.727.6912) and 8-455-DSBURRI (9-829.554.8997). If you or someone you know talks about suicide or feeling hopeless, get help right away. When should you call for help? Call 911 anytime you think you may need emergency care.  For example, call if:    · You are thinking about suicide or are threatening suicide.     · You feel you cannot stop from hurting yourself or someone else.     · You hear or see things that aren't real.     · You think or speak in a bizarre way that is not like your usual behavior.    Call your doctor now or seek immediate medical care if:    · You are drinking a lot of alcohol or using illegal drugs.     · You are talking or writing about death.    Watch closely for changes in your health, and be sure to contact your doctor if:    · You find it hard or it's getting harder to deal with school, a job, family, or friends.     · You think your treatment is not helping or you are not getting better.     · Your symptoms get worse or you get new symptoms.     · You have any problems with your antidepressant medicines, such as side effects, or you are thinking about stopping your medicine.     · You are having manic behavior, such as having very high energy, needing less sleep than normal, or showing risky behavior such as spending money you don't have or abusing others verbally or physically. Where can you learn more? Go to http://pepe-shmuel.info/. Enter O863 in the search box to learn more about \"Preventing a Relapse of Depression: Care Instructions. \"  Current as of: September 11, 2018  Content Version: 11.9  © 2865-5753 [employer]. Care instructions adapted under license by [employer] (which disclaims liability or warranty for this information). If you have questions about a medical condition or this instruction, always ask your healthcare professional. [place] [address] any warranty or liability for your use of this information. If I feel I am at risk of hurting myself or others, I will call the crisis office and speak with a crisis worker who will assist me during my crisis.   [address]  [phone]  [address] [place] [address] [phone] [facility]  Nisa  crisis- [phone]

## 2019-06-17 NOTE — BH NOTES
Patient was in a pleasant mood. His mother was present for visitation. Patient is focused on discharge. He states his mother will be waiting for him in the emergency department for his discharge orders. He denies any current feelings of SI or HI. No auditory or hallucinations. Q 15 minute safety checks continue.  Azul SAGEN, RN

## 2019-06-17 NOTE — BH NOTES
PSYCHOSOCIAL ASSESSMENT  :Patient identifying info:  Maykel Nguyễn is a 38 y.o., male admitted 6/14/2019  9:48 AM     Presenting problem and precipitating factors:  Per prescreening pt was in contact with CM and made disclosures of SI and later that day it escalated to SI and HI stating he was going to kill self and parents. Pt expressed he wanted to be hospitalized. Pt has a history of medication noncompliance with last appointment in October 2018. Mental status assessment:  Pt is alert and oriented. Pt denies SI/HI. Pt's mood is euthymic, affect is bright. Pt's thought process is coherent and simple. Pt's insight and judgment is limited, reliability is fair. Strengths: Mother whom advocates for his mental health treatment. Collateral information:  Mariel Nova - mother [phone]    Current psychiatric /substance abuse providers and contact info: [facility] worker Lloyd Sanderson & NP [address]     Previous psychiatric/substance abuse providers and response to treatment: unknown at this time     Family history of mental illness or substance abuse: None     Substance abuse history: UDS - and BAL=0No history of drug use.     Social History     Tobacco Use    Smoking status: Not on file   Substance Use Topics    Alcohol use: Not on file       History of biomedical complications associated with substance abuse : n/a    Patient's current acceptance of treatment or motivation for change: agreeable with follow up plan at b/reach    Family constellation: single and no children     Is significant other involved? n/a      Describe support system: supportive mother and reach services     Describe living arrangements and home environment: lives with mother in [address] [place], [place] [address] Problems  Never Reviewed          Codes Class Noted POA    Depression ICD-10-CM: F32.9  ICD-9-CM: 311  6/14/2019 Unknown        Conduct disorder ICD-10-CM: F91.9  ICD-9-CM: 312.9  6/14/2019 Unknown Trauma history: Unknown     Legal issues: none reported. History of  service: none     Financial status: ssdi    Spiritism/cultural factors: none expressed at this time. Education/work history: 12th grade history of special education classes and involvement with [employer] services. Have you been licensed as a health care professional (current or ): no    Leisure and recreation preferences: unknown at this time    Describe coping skills:  Poor.     Chaparrita Castillo  2019

## 2019-06-25 NOTE — DISCHARGE SUMMARY
Some parts of the discharge summary are from the initial Psychiatric interview that was done on admission by the admitting psychiatrist.      Date of Admission: 6/14/2019    Date of Discharge:6/25/2019     TYPE OF DISCHARGE:   REGULAR -  NO  AMA - YES  RELEASED BY THE TDO ASSESSMENT TEAM - YES       REASON FOR HOSPITALIZATION:  CC: \"I want to go home\". HISTORY OF PRESENT ILLNESS:    Patient is a poor historian. He is focused on going home and would not talk about other issues. He did admit to SI before coming to hospital but did not elaborate on this. Per ER notes, patient had expressed SI and in addition had reported to ER staff he had thought of smothering mother with pillows. Currently denies SI/HI but displays manipulative behavior as when informed that he could not be discharged today, he stated that he would attempt to jump out through the window. He however later retracted this statement. Denies psychotic symptoms. ALLERGIES: No Known Allergies   MEDICATIONS PRIOR TO ADMISSION:        Medications Prior to Admission   Medication Sig    escitalopram oxalate (LEXAPRO) 10 mg tablet Take 10 mg by mouth daily.  QUEtiapine (SEROQUEL) 50 mg tablet Take 50 mg by mouth three (3) times daily. Indications: Psychotic disorder    QUEtiapine (SEROQUEL) 100 mg tablet Take 100 mg by mouth nightly. Indications: Psychotic disorder    lisinopril (PRINIVIL, ZESTRIL) 5 mg tablet Take 7.5 mg by mouth daily. Indications: high blood pressure    metFORMIN (GLUCOPHAGE) 1,000 mg tablet Take 1,000 mg by mouth two (2) times a day. Indications: type 2 diabetes mellitus    raNITIdine (ZANTAC) 150 mg tablet Take 150 mg by mouth nightly. Indications: heartburn    ibuprofen (MOTRIN) 600 mg tablet Take 600 mg by mouth every eight (8) hours as needed for Pain.  traZODone (DESYREL) 150 mg tablet Take 150 mg by mouth nightly as needed for Sleep.  pantoprazole (PROTONIX) 40 mg tablet Take 40 mg by mouth daily.  Indications: heartburn    atorvastatin (LIPITOR) 40 mg tablet Take 40 mg by mouth daily. Indications: excessive fat in the blood       PAST MEDICAL HISTORY:   No past medical history on file. No past surgical history on file. SOCIAL HISTORY:   Social History      Socioeconomic History    Marital status: SINGLE       Spouse name: Not on file    Number of children: Not on file    Years of education: Not on file    Highest education level: Not on file   Occupational History    Not on file   Social Needs    Financial resource strain: Not on file    Food insecurity:       Worry: Not on file       Inability: Not on file    Transportation needs:       Medical: Not on file       Non-medical: Not on file   Tobacco Use    Smoking status: Not on file   Substance and Sexual Activity    Alcohol use: Not on file    Drug use: Not on file    Sexual activity: Not on file   Lifestyle    Physical activity:       Days per week: Not on file       Minutes per session: Not on file    Stress: Not on file   Relationships    Social connections:       Talks on phone: Not on file       Gets together: Not on file       Attends [employer] service: Not on file       Active member of club or organization: Not on file       Attends meetings of clubs or organizations: Not on file       Relationship status: Not on file    Intimate partner violence:       Fear of current or ex partner: Not on file       Emotionally abused: Not on file       Physically abused: Not on file       Forced sexual activity: Not on file   Other Topics Concern    Not on file   Social History Narrative    Not on file       FAMILY HISTORY: History reviewed. No pertinent family history. No family history on file. REVIEW OF SYSTEMS:   Pertinent items are noted in the History of Present Illness.   All other Systems reviewed and are considered negative.               MENTAL STATUS EXAM & VITALS      MENTAL STATUS EXAM (MSE):    MSE FINDINGS ARE WITHIN NORMAL LIMITS (WNL) UNLESS OTHERWISE STATED BELOW. ( ALL OF THE BELOW CATEGORIES OF THE MSE HAVE BEEN REVIEWED (reviewed 6/15/2019) AND UPDATED AS DEEMED APPROPRIATE )  Appearance: Well kempt, maintains eye contact  Behavior: Manipulative. Speech: WNL  Mood: \"OK\"  Affect: Constricted  Thought process: Logical  Thought content: No delusions elicited  SI/HI: Denies  Perception: Denies AH/VH  Insight: Impaired                                                                                                                                      VITALS:     Patient Vitals for the past 24 hrs:    Temp Pulse Resp BP SpO2   06/15/19 0800 97.8 °F (36.6 °C) 98 20 157/87 95 %   06/14/19 1930 98 °F (36.7 °C) 86 20 149/67 99 %          Wt Readings from Last 3 Encounters:   06/14/19 147.4 kg (325 lb)          Temp Readings from Last 3 Encounters:   06/15/19 97.8 °F (36.6 °C)          BP Readings from Last 3 Encounters:   06/15/19 157/87          Pulse Readings from Last 3 Encounters:   06/15/19 98                DATA      LABORATORY DATA:  Labs Reviewed - No data to display  No results found for any previous visit.          RADIOLOGY REPORTS:  No results found for this or any previous visit. No results found.               MEDICATIONS         ALL MEDICATIONS         Current Facility-Administered Medications   Medication Dose Route Frequency    alum-mag hydroxide-simeth (MYLANTA) oral suspension 30 mL  30 mL Oral Q4H PRN    ziprasidone (GEODON) 20 mg in sterile water (preservative free) 1 mL injection  20 mg IntraMUSCular BID PRN    OLANZapine (ZyPREXA) tablet 5 mg  5 mg Oral Q6H PRN    benztropine (COGENTIN) tablet 2 mg  2 mg Oral BID PRN    benztropine (COGENTIN) injection 2 mg  2 mg IntraMUSCular BID PRN    LORazepam (ATIVAN) injection 2 mg  2 mg IntraMUSCular Q4H PRN    acetaminophen (TYLENOL) tablet 650 mg  650 mg Oral Q4H PRN    ibuprofen (MOTRIN) tablet 400 mg  400 mg Oral Q8H PRN    magnesium hydroxide (MILK OF MAGNESIA) 400 mg/5 mL oral suspension 30 mL  30 mL Oral DAILY PRN    nicotine (NICODERM CQ) 21 mg/24 hr patch 1 Patch  1 Patch TransDERmal DAILY PRN    hydrOXYzine HCl (ATARAX) tablet 50 mg  50 mg Oral Q6H PRN    traZODone (DESYREL) tablet 50 mg  50 mg Oral QHS PRN    atorvastatin (LIPITOR) tablet 40 mg  40 mg Oral DAILY    lisinopril (PRINIVIL, ZESTRIL) tablet 7.5 mg  7.5 mg Oral DAILY    metFORMIN (GLUCOPHAGE) tablet 1,000 mg  1,000 mg Oral BID    pantoprazole (PROTONIX) tablet 40 mg  40 mg Oral DAILY       SCHEDULED MEDICATIONS         Current Facility-Administered Medications   Medication Dose Route Frequency    atorvastatin (LIPITOR) tablet 40 mg  40 mg Oral DAILY    lisinopril (PRINIVIL, ZESTRIL) tablet 7.5 mg  7.5 mg Oral DAILY    metFORMIN (GLUCOPHAGE) tablet 1,000 mg  1,000 mg Oral BID    pantoprazole (PROTONIX) tablet 40 mg  40 mg Oral DAILY                      ASSESSMENT & PLAN         The patient, Winnie Rivera, is a 38 y.o.  male who presents at this time for treatment of the following diagnoses:  Patient Active Hospital Problem List:   Depression (6/14/2019)    Assessment: Mood disorder                           History of conduct disorder (per ER notes)       Course in the Hospital:     Patient was admitted to the inpatient psychiatry unit for acute psychiatric stabilization in regards to symptomatology as described in the HPI above and placed on Q15 minute checks and withdrawal precautions. While on the unit Winnie Rivera was involved in individual, group, occupational and milieu therapy. He was started back on his usual medication regimen as well as PRN medications including  . He improved gradually and was able to integrate into the milieu with help from the nursing staff. Patients symptoms improved gradually including Seroquel, Lexapro, Metformin and Lisinopril. He requested to be discharged and was evaluated by the [facility] crisis team and they felt he dd not meet criteria for a TDO.  He was discharged AMA at his request with follow up at the [address] [place] [address]. DISCHARGE DIAGNOSIS:        Discharge Medication List as of 6/18/2019  1:52 PM      STOP taking these medications       escitalopram oxalate (LEXAPRO) 10 mg tablet Comments:   Reason for Stopping:         QUEtiapine (SEROQUEL) 50 mg tablet Comments:   Reason for Stopping:         QUEtiapine (SEROQUEL) 100 mg tablet Comments:   Reason for Stopping:         lisinopril (PRINIVIL, ZESTRIL) 5 mg tablet Comments:   Reason for Stopping:         metFORMIN (GLUCOPHAGE) 1,000 mg tablet Comments:   Reason for Stopping:         raNITIdine (ZANTAC) 150 mg tablet Comments:   Reason for Stopping:         ibuprofen (MOTRIN) 600 mg tablet Comments:   Reason for Stopping:         traZODone (DESYREL) 150 mg tablet Comments:   Reason for Stopping:         pantoprazole (PROTONIX) 40 mg tablet Comments:   Reason for Stopping:         atorvastatin (LIPITOR) 40 mg tablet Comments:   Reason for Stopping: Follow-up Information     Follow up With Specialties Details Why [id number] [place] [address]  Go on 6/18/2019 Walk in Monday - Friday 8:00 AM - 3:00 PM.  Please bring photo I.D., list of medications and insurance card. [facility]  Address: [address] [place] [address].  Phone: [phone]        WOUND CARE: none needed. PROGNOSIS:   Good / Fair based on nature of patient's pathology/ies and treatment compliance issues. Prognosis is greatly dependent upon patient's ability to  follow up on psychiatric/psychotherapy appointments as well as to comply with psychiatric medications as prescribed. Kassandra Solorzano